# Patient Record
Sex: FEMALE | Race: OTHER | Employment: UNEMPLOYED | URBAN - METROPOLITAN AREA
[De-identification: names, ages, dates, MRNs, and addresses within clinical notes are randomized per-mention and may not be internally consistent; named-entity substitution may affect disease eponyms.]

---

## 2021-03-16 ENCOUNTER — OFFICE VISIT (OUTPATIENT)
Dept: OBGYN CLINIC | Age: 36
End: 2021-03-16

## 2021-03-16 VITALS
BODY MASS INDEX: 22.78 KG/M2 | WEIGHT: 116 LBS | SYSTOLIC BLOOD PRESSURE: 112 MMHG | DIASTOLIC BLOOD PRESSURE: 60 MMHG | HEIGHT: 60 IN

## 2021-03-16 DIAGNOSIS — R10.2 PELVIC PAIN: Primary | ICD-10-CM

## 2021-03-16 DIAGNOSIS — N94.10 DYSPAREUNIA, FEMALE: ICD-10-CM

## 2021-03-16 PROCEDURE — 99203 OFFICE O/P NEW LOW 30 MIN: CPT | Performed by: OBSTETRICS & GYNECOLOGY

## 2021-03-16 NOTE — PROGRESS NOTES
Pelvic Pain evaluation    Haritha Fletcher is a 35 y.o. female who complains of pelvic pain.   The pain is described as aching, and varies in intensity.   Pain is located in the right and left side pelvis without radiation.     The pain started 2 years ago.   Her symptoms have been unchanged since.   Aggravating factors: intercourse.    Alleviating factors: abstinence.   Associated symptoms: none.   The patient denies fever.    Ultrasound performed in office today by ultrasound technician reveals:    TRANSVAGINAL ULTRASOUND PERFORMED  UTERUS IS RETROVERTED, NORMAL IN SIZE AND ECHOGENICITY.  ENDOMETRIUM MEASURES 1-2MM IN THICKNESS. NO EVIDENCE OF MASSES OR ABNORMALITIES ARE SEEN.  RIGHT OVARY APPEARS WITHIN NORMAL LIMITS.  LEFT OVARY APPEARS WITHIN NORMAL LIMITS.  SCANT FREE FLUID SEEN IN THE CDS.    History reviewed. No pertinent past medical history.  Past Surgical History:   Procedure Laterality Date   • HX  SECTION     • HX  SECTION       Social History     Occupational History   • Not on file   Tobacco Use   • Smoking status: Never Smoker   • Smokeless tobacco: Never Used   Substance and Sexual Activity   • Alcohol use: No   • Drug use: Never   • Sexual activity: Yes     Partners: Male     Birth control/protection: Pill, Implant     Family History   Problem Relation Age of Onset   • Hypertension Mother        No Known Allergies  Prior to Admission medications    Medication Sig Start Date End Date Taking? Authorizing Provider   omeprazole (PRILOSEC) 40 mg capsule Take 1 Cap by mouth daily. 13   Davon Barlow PA   ondansetron (ZOFRAN ODT) 4 mg disintegrating tablet Take 1 Tab by mouth every eight (8) hours as needed for Nausea. 13   Davon Barlow PA   HYDROcodone-acetaminophen (NORCO) 5-325 mg per tablet Take 1 Tab by mouth every four (4) hours as needed for Pain. 13   Davon Barlow PA        Review of Systems: History obtained from the patient  Constitutional: negative for  weight loss, fever, night sweats  Breast: negative for breast lumps, nipple discharge, galactorrhea  GI: negative for change in bowel habits, abdominal pain, black or bloody stools  : negative for frequency, dysuria, hematuria, vaginal discharge  MSK: negative for back pain, joint pain, muscle pain  Skin: negative for itching, rash, hives  Psych: negative for anxiety, depression, change in mood      Objective:    Visit Vitals  /60   Ht 5' (1.524 m)   Wt 116 lb (52.6 kg)   LMP 03/05/2021   BMI 22.65 kg/m²       Physical Exam:     Constitutional  · Appearance: well-nourished, well developed, alert, in no acute distress    Gastrointestinal  · Abdominal Examination: abdomen non-tender to palpation, normal bowel sounds, no masses present  · Liver and spleen: no hepatomegaly present, spleen not palpable  · Hernias: no hernias identified    Genitourinary  · External Genitalia: normal appearance for age, no discharge present, no tenderness present, no inflammatory lesions present, no masses present, no atrophy present  · Vagina: normal vaginal vault without central or paravaginal defects, no discharge present, no inflammatory lesions present, no masses present  · Bladder: non-tender to palpation  · Urethra: appears normal  · Cervix: normal   · Uterus: normal size, shape and consistency  · Adnexa: no adnexal tenderness present, no adnexal masses present  · Perineum: perineum within normal limits, no evidence of trauma, no rashes or skin lesions present  · Anus: anus within normal limits, no hemorrhoids present  · Inguinal Lymph Nodes: no lymphadenopathy present    Skin  · General Inspection: no rash, no lesions identified    Neurologic/Psychiatric  · Mental Status:  · Orientation: grossly oriented to person, place and time  · Mood and Affect: mood normal, affect appropriate    Assessment:  dyspareunia. Normal US    Plan:   [address] sent  She just started OCPs a month ago.  Advised to try OCPs for 3-4 months to see if helps her pain. Also offered [employer] if OCPs don't help. Discussed diagnostic laparoscopy to r/o endometriosis and given handouts about endometriosis, dyspareunia and chronic pelvic pain. RTO prn if symptoms persist or worsen. Instructions given to pt. Handouts given to pt.

## 2021-03-18 LAB
C TRACH RRNA SPEC QL NAA+PROBE: NEGATIVE
N GONORRHOEA RRNA SPEC QL NAA+PROBE: NEGATIVE
T VAGINALIS DNA SPEC QL NAA+PROBE: NEGATIVE